# Patient Record
Sex: MALE | Race: WHITE | URBAN - METROPOLITAN AREA
[De-identification: names, ages, dates, MRNs, and addresses within clinical notes are randomized per-mention and may not be internally consistent; named-entity substitution may affect disease eponyms.]

---

## 2017-01-30 ENCOUNTER — EMERGENCY (EMERGENCY)
Facility: HOSPITAL | Age: 37
LOS: 1 days | Discharge: HOME | End: 2017-01-30
Attending: EMERGENCY MEDICINE
Payer: COMMERCIAL

## 2017-01-30 VITALS
DIASTOLIC BLOOD PRESSURE: 81 MMHG | TEMPERATURE: 98 F | RESPIRATION RATE: 20 BRPM | OXYGEN SATURATION: 97 % | WEIGHT: 199.96 LBS | HEART RATE: 110 BPM | SYSTOLIC BLOOD PRESSURE: 128 MMHG

## 2017-01-30 DIAGNOSIS — R04.0 EPISTAXIS: ICD-10-CM

## 2017-01-30 PROCEDURE — 99282 EMERGENCY DEPT VISIT SF MDM: CPT

## 2017-01-30 PROCEDURE — T1013: CPT

## 2017-01-30 NOTE — ED STATDOCS - OBJECTIVE STATEMENT
37 y/o male presents to ED c/o intermittent epistaxis from right naris x 3 days. Pt reports that he did not pinch his nostrils to stop the bleeding. He states that he held paper towels under his nose to catch the blood until the bleeding stopped. Denies foreign body entry to right naris. No PMHx. No further complaints at this time.

## 2017-01-30 NOTE — ED STATDOCS - DETAILS:
I, David Wiseman, performed the initial face to face bedside interview with this patient regarding history of present illness, review of symptoms and relevant past medical, social and family history.  I completed an independent physical examination.    The history, relevant review of systems, past medical and surgical history, medical decision making, and physical examination was documented by the scribe in my presence and I attest to the accuracy of the documentation.

## 2017-01-30 NOTE — ED STATDOCS - NS ED MD SCRIBE ATTENDING SCRIBE SECTIONS
VITAL SIGNS( Pullset)/HIV/HISTORY OF PRESENT ILLNESS/PAST MEDICAL/SURGICAL/SOCIAL HISTORY/REVIEW OF SYSTEMS/DISPOSITION/PHYSICAL EXAM

## 2021-12-12 ENCOUNTER — TRANSCRIPTION ENCOUNTER (OUTPATIENT)
Age: 41
End: 2021-12-12

## 2023-01-27 ENCOUNTER — OFFICE (OUTPATIENT)
Dept: URBAN - METROPOLITAN AREA CLINIC 94 | Facility: CLINIC | Age: 43
Setting detail: OPHTHALMOLOGY
End: 2023-01-27
Payer: COMMERCIAL

## 2023-01-27 DIAGNOSIS — H11.151: ICD-10-CM

## 2023-01-27 DIAGNOSIS — E11.9: ICD-10-CM

## 2023-01-27 DIAGNOSIS — H11.042: ICD-10-CM

## 2023-01-27 DIAGNOSIS — H35.033: ICD-10-CM

## 2023-01-27 PROCEDURE — 99213 OFFICE O/P EST LOW 20 MIN: CPT | Performed by: PHYSICIAN ASSISTANT

## 2023-01-27 ASSESSMENT — KERATOMETRY
OD_K1POWER_DIOPTERS: 39.50
OS_AXISANGLE_DEGREES: 080
OD_K2POWER_DIOPTERS: 40.25
OS_K2POWER_DIOPTERS: 40.25
OS_K1POWER_DIOPTERS: 39.50
OD_AXISANGLE_DEGREES: 080

## 2023-01-27 ASSESSMENT — SPHEQUIV_DERIVED
OS_SPHEQUIV: -0.125
OD_SPHEQUIV: -0.125

## 2023-01-27 ASSESSMENT — CONFRONTATIONAL VISUAL FIELD TEST (CVF)
OS_FINDINGS: FULL
OD_FINDINGS: FULL

## 2023-01-27 ASSESSMENT — VISUAL ACUITY
OD_BCVA: 20/20
OS_BCVA: 20/20

## 2023-01-27 ASSESSMENT — REFRACTION_AUTOREFRACTION
OD_CYLINDER: -0.25
OS_CYLINDER: -0.25
OD_SPHERE: 0.00
OS_SPHERE: 0.00
OD_AXIS: 015
OS_AXIS: 160

## 2023-01-27 ASSESSMENT — REFRACTION_MANIFEST
OD_SPHERE: PLANO
OS_SPHERE: PLANO
OS_VA1: 20/20
OD_VA1: 20/20
OS_SPHERE: PLANO
OS_VA1: 20/20
OD_VA1: 20/20
OD_SPHERE: PLANO

## 2023-01-27 ASSESSMENT — AXIALLENGTH_DERIVED
OS_AL: 25.0579
OD_AL: 25.0579

## 2023-01-27 ASSESSMENT — TONOMETRY
OD_IOP_MMHG: 15
OS_IOP_MMHG: 16

## 2023-01-27 ASSESSMENT — CORNEAL SURGICAL SCARRING: OS_SCARRING: ANTERIOR STROMAL

## 2023-08-25 ENCOUNTER — OUTPATIENT (OUTPATIENT)
Dept: OUTPATIENT SERVICES | Facility: HOSPITAL | Age: 43
LOS: 1 days | End: 2023-08-25

## 2023-08-25 ENCOUNTER — APPOINTMENT (OUTPATIENT)
Dept: ULTRASOUND IMAGING | Facility: CLINIC | Age: 43
End: 2023-08-25
Payer: MEDICAID

## 2023-08-25 DIAGNOSIS — E11.9 TYPE 2 DIABETES MELLITUS WITHOUT COMPLICATIONS: ICD-10-CM

## 2023-08-25 PROCEDURE — 93923 UPR/LXTR ART STDY 3+ LVLS: CPT | Mod: 26

## 2024-01-08 ENCOUNTER — OFFICE (OUTPATIENT)
Dept: URBAN - METROPOLITAN AREA CLINIC 94 | Facility: CLINIC | Age: 44
Setting detail: OPHTHALMOLOGY
End: 2024-01-08
Payer: COMMERCIAL

## 2024-01-08 DIAGNOSIS — H11.151: ICD-10-CM

## 2024-01-08 DIAGNOSIS — H35.033: ICD-10-CM

## 2024-01-08 PROCEDURE — 92250 FUNDUS PHOTOGRAPHY W/I&R: CPT | Performed by: PHYSICIAN ASSISTANT

## 2024-01-08 PROCEDURE — 92014 COMPRE OPH EXAM EST PT 1/>: CPT | Performed by: PHYSICIAN ASSISTANT

## 2024-01-08 ASSESSMENT — REFRACTION_AUTOREFRACTION
OD_SPHERE: +0.25
OS_CYLINDER: -0.50
OD_AXIS: 011
OD_CYLINDER: -0.75
OS_AXIS: 155
OS_SPHERE: PLANO

## 2024-01-08 ASSESSMENT — REFRACTION_MANIFEST
OS_VA1: 20/20
OS_SPHERE: PLANO
OD_VA1: 20/20
OS_VA1: 20/20
OS_SPHERE: PLANO
OD_SPHERE: PLANO
OD_VA1: 20/20
OD_SPHERE: PLANO

## 2024-01-08 ASSESSMENT — CORNEAL SURGICAL SCARRING: OS_SCARRING: ANTERIOR STROMAL

## 2024-01-08 ASSESSMENT — CONFRONTATIONAL VISUAL FIELD TEST (CVF)
OS_FINDINGS: FULL
OD_FINDINGS: FULL

## 2024-01-08 ASSESSMENT — SPHEQUIV_DERIVED: OD_SPHEQUIV: -0.125

## 2024-08-07 ENCOUNTER — NON-APPOINTMENT (OUTPATIENT)
Age: 44
End: 2024-08-07

## 2024-12-31 ENCOUNTER — EMERGENCY (EMERGENCY)
Facility: HOSPITAL | Age: 44
LOS: 1 days | Discharge: HOME | End: 2024-12-31
Attending: STUDENT IN AN ORGANIZED HEALTH CARE EDUCATION/TRAINING PROGRAM
Payer: SELF-PAY

## 2024-12-31 VITALS
OXYGEN SATURATION: 96 % | WEIGHT: 240.08 LBS | RESPIRATION RATE: 16 BRPM | DIASTOLIC BLOOD PRESSURE: 88 MMHG | SYSTOLIC BLOOD PRESSURE: 138 MMHG | HEART RATE: 96 BPM

## 2024-12-31 LAB
ALBUMIN SERPL ELPH-MCNC: 4.2 G/DL — SIGNIFICANT CHANGE UP (ref 3.3–5.2)
ALP SERPL-CCNC: 106 U/L — SIGNIFICANT CHANGE UP (ref 40–120)
ALT FLD-CCNC: 54 U/L — HIGH
ANION GAP SERPL CALC-SCNC: 16 MMOL/L — SIGNIFICANT CHANGE UP (ref 5–17)
APAP SERPL-MCNC: <3 UG/ML — LOW (ref 10–26)
AST SERPL-CCNC: 69 U/L — HIGH
BASOPHILS # BLD AUTO: 0.09 K/UL — SIGNIFICANT CHANGE UP (ref 0–0.2)
BASOPHILS NFR BLD AUTO: 1.1 % — SIGNIFICANT CHANGE UP (ref 0–2)
BILIRUB SERPL-MCNC: 0.7 MG/DL — SIGNIFICANT CHANGE UP (ref 0.4–2)
BUN SERPL-MCNC: 6.1 MG/DL — LOW (ref 8–20)
CALCIUM SERPL-MCNC: 8.3 MG/DL — LOW (ref 8.4–10.5)
CHLORIDE SERPL-SCNC: 93 MMOL/L — LOW (ref 96–108)
CK MB CFR SERPL CALC: 6.5 NG/ML — SIGNIFICANT CHANGE UP (ref 0–6.7)
CK SERPL-CCNC: 516 U/L — HIGH (ref 30–200)
CO2 SERPL-SCNC: 27 MMOL/L — SIGNIFICANT CHANGE UP (ref 22–29)
CREAT SERPL-MCNC: 0.55 MG/DL — SIGNIFICANT CHANGE UP (ref 0.5–1.3)
EGFR: 128 ML/MIN/1.73M2 — SIGNIFICANT CHANGE UP
EOSINOPHIL # BLD AUTO: 0.03 K/UL — SIGNIFICANT CHANGE UP (ref 0–0.5)
EOSINOPHIL NFR BLD AUTO: 0.4 % — SIGNIFICANT CHANGE UP (ref 0–6)
ETHANOL SERPL-MCNC: 408 MG/DL — HIGH (ref 0–9)
GLUCOSE SERPL-MCNC: 235 MG/DL — HIGH (ref 70–99)
HCT VFR BLD CALC: 42.7 % — SIGNIFICANT CHANGE UP (ref 39–50)
HGB BLD-MCNC: 15.1 G/DL — SIGNIFICANT CHANGE UP (ref 13–17)
IMM GRANULOCYTES NFR BLD AUTO: 0.1 % — SIGNIFICANT CHANGE UP (ref 0–0.9)
LYMPHOCYTES # BLD AUTO: 2.09 K/UL — SIGNIFICANT CHANGE UP (ref 1–3.3)
LYMPHOCYTES # BLD AUTO: 26.4 % — SIGNIFICANT CHANGE UP (ref 13–44)
MCHC RBC-ENTMCNC: 29.8 PG — SIGNIFICANT CHANGE UP (ref 27–34)
MCHC RBC-ENTMCNC: 35.4 G/DL — SIGNIFICANT CHANGE UP (ref 32–36)
MCV RBC AUTO: 84.4 FL — SIGNIFICANT CHANGE UP (ref 80–100)
MONOCYTES # BLD AUTO: 0.48 K/UL — SIGNIFICANT CHANGE UP (ref 0–0.9)
MONOCYTES NFR BLD AUTO: 6.1 % — SIGNIFICANT CHANGE UP (ref 2–14)
NEUTROPHILS # BLD AUTO: 5.21 K/UL — SIGNIFICANT CHANGE UP (ref 1.8–7.4)
NEUTROPHILS NFR BLD AUTO: 65.9 % — SIGNIFICANT CHANGE UP (ref 43–77)
PLATELET # BLD AUTO: 219 K/UL — SIGNIFICANT CHANGE UP (ref 150–400)
POTASSIUM SERPL-MCNC: 3.1 MMOL/L — LOW (ref 3.5–5.3)
POTASSIUM SERPL-SCNC: 3.1 MMOL/L — LOW (ref 3.5–5.3)
PROT SERPL-MCNC: 8 G/DL — SIGNIFICANT CHANGE UP (ref 6.6–8.7)
RBC # BLD: 5.06 M/UL — SIGNIFICANT CHANGE UP (ref 4.2–5.8)
RBC # FLD: 11.9 % — SIGNIFICANT CHANGE UP (ref 10.3–14.5)
SALICYLATES SERPL-MCNC: <0.6 MG/DL — LOW (ref 10–20)
SODIUM SERPL-SCNC: 136 MMOL/L — SIGNIFICANT CHANGE UP (ref 135–145)
WBC # BLD: 7.91 K/UL — SIGNIFICANT CHANGE UP (ref 3.8–10.5)
WBC # FLD AUTO: 7.91 K/UL — SIGNIFICANT CHANGE UP (ref 3.8–10.5)

## 2024-12-31 PROCEDURE — 70450 CT HEAD/BRAIN W/O DYE: CPT | Mod: 26

## 2024-12-31 PROCEDURE — 72125 CT NECK SPINE W/O DYE: CPT | Mod: 26

## 2024-12-31 PROCEDURE — 99285 EMERGENCY DEPT VISIT HI MDM: CPT

## 2024-12-31 PROCEDURE — 93010 ELECTROCARDIOGRAM REPORT: CPT

## 2024-12-31 RX ORDER — KETAMINE HCL 100 MG/ML
200 VIAL (ML) INJECTION ONCE
Refills: 0 | Status: DISCONTINUED | OUTPATIENT
Start: 2024-12-31 | End: 2024-12-31

## 2024-12-31 RX ORDER — LORAZEPAM 1 MG/1
2 TABLET ORAL ONCE
Refills: 0 | Status: DISCONTINUED | OUTPATIENT
Start: 2024-12-31 | End: 2024-12-31

## 2024-12-31 RX ADMIN — LORAZEPAM 2 MILLIGRAM(S): 1 TABLET ORAL at 20:02

## 2024-12-31 RX ADMIN — Medication 200 MILLIGRAM(S): at 15:55

## 2024-12-31 NOTE — ED PROVIDER NOTE - ATTENDING CONTRIBUTION TO CARE
40-year-old male with unclear past medical history presents to the ED for altered mental status.  Patient was found unresponsive in the street by EMS with abrasion noted to forehead, slurring words.  Patient admits to drinking alcohol but unclear how much.  Patient became combative agitated attempting to tackle staff.  Patient given IM ketamine for safety of patient and staff and for medical workup.  Priority head and C-spine ordered which were negative for acute pathology.  Patient signed out to night team pending clinical sobriety

## 2024-12-31 NOTE — ED PROVIDER NOTE - PATIENT PORTAL LINK FT
You can access the FollowMyHealth Patient Portal offered by [facility] by registering at the following website: [email or web address]. By joining [employer]’s FollowMyHealth portal, you will also be able to view your health information using other applications (apps) compatible with our system.

## 2024-12-31 NOTE — ED PROVIDER NOTE - NSFOLLOWUPINSTRUCTIONS_ED_ALL_ED_FT
- Acuda a kayleigh stephan de seguimiento con hinojosa médico en un plazo de 2 a 3 días.   - Acuda a kayleigh stephan de seguimiento con cirugía ortopédica en un plazo de 1 a 3 semanas   - Lleve los resultados a la stephan.     - Manténgase juan josé hidratado.   - Regrese al servicio de urgencias si aparecen síntomas nuevos o empeoran.  - Por favor, tome jhon medicamentos según lo recetado  - [place] de 500 mg a 1000 mg de Tylenol (acetaminofén) con alimentos cada 6 horas irena 3 días para reducir la inflamación y tratar el dolor. Después, puede tomarlo según sea necesario; siga las instrucciones del envase. La dosis estándar de Tylenol es de 500 mg, por lo que debe jany 1 o 2 pastillas por dosis. No tome más de 4000 mg por día.  - [place] 600 mg de ibuprofeno (Motrin, Advil) con alimentos cada 8 horas irena 3 días para reducir la inflamación y tratar el dolor. Después, puede tomarlo según sea necesario; siga las instrucciones del envase. La dosis estándar de Motrin o Advil es de 200 mg, por lo que debe jany de 3 a 4 pastillas por dosis. No lo tome si está embarazada o planea quedar embarazada.  - Para de jany alcol   Esguince de tobillo  Ankle Sprain  Illustration of an ankle sprain caused by a foot turning outward and a foot turning inward.  El esguince de tobillo es la distensión o el desgarro de un ligamento del tobillo. Los ligamentos son tejidos que conectan los huesos.    Los dos tipos de esguince de tobillo más frecuentes son los siguientes:  Esguince por inversión. [place]ress sucede cuando el pie gira hacia adentro y el tobillo gira hacia afuera. Afecta el ligamento de la parte externa del pie (ligamento lateral).  Esguince por eversión. [place] sucede cuando el pie gira hacia afuera y el tobillo gira hacia adentro. Afecta el ligamento de la parte interna del pie (ligamento medial).  ¿Cuáles son las causas?  La causa de un esguince de tobillo suele ser la rotación o la torcedura del tobillo por accidente.    ¿Qué incrementa el riesgo?  Tiene más probabilidades de tener un esguince de tobillo si practica deportes.    ¿Cuáles son los signos o síntomas?  Comparison of a normal ankle and an ankle that is swollen and bruised.  Los síntomas de un esguince de tobillo incluyen los siguientes:  Dolor en el tobillo.  Hinchazón.  Moretones. Los moretones pueden aparecer inmediatamente después del esguince de tobillo, o 1 o 2 días después.  Dificultad para mantenerse de pie o caminar. [place] incluye problemas para girar o para cambiar de dirección.  ¿Cómo se diagnostica?  El esguince de tobillo se diagnostica con un examen físico. El médico le presionará ciertas partes del pie y el tobillo e intentará moverlas de formas determinadas.    También es posible que le tomen radiografías. Le pueden jany estas imágenes para determinar la gravedad del esguince y para detectar si hay huesos fracturados.    ¿Cómo se trata?  El esguince de tobillo puede tratarse con lo siguiente:  Un dispositivo ortopédico o kayleigh férula. Se utiliza para evitar los movimientos del tobillo hasta que se cure.  Kayleigh venda elástica (vendaje). Se utiliza para sostener el tobillo.  Muletas.  Analgésicos.  Cirugía. [place] puede ser necesario si el esguince es grave.  Fisioterapia. Puede ayudar a mejorar la amplitud de movimiento del tobillo.  Siga estas indicaciones en hinojosa casa:  Si tiene un dispositivo ortopédico o kayleigh férula desmontables:    Use el dispositivo ortopédico o la férula nitin se lo haya indicado el médico. Quíteselos solamente nitin se lo haya indicado el médico.  Controle todos los bianca la piel alrededor de la férula o el dispositivo ortopédico. Informe al médico acerca de cualquier inquietud.  Afloje el dispositivo ortopédico o la férula si siente hormigueo en los dedos de los pies, si se le adormecen, o se le enfrían y se tornan de color jolynn.  Mantenga el dispositivo ortopédico y la férula limpios.  Si el dispositivo ortopédico o la férula no son impermeables:  No deje que se mojen.  Cúbralos con un envoltorio hermético cuando tome un baño de inmersión o kayleigh ducha.  Si tiene un vendaje elástico:    Quítese el vendaje para bañarse o ducharse.  Si siente que el vendaje está muy ajustado, aflójelo un poco para estar más cómodo.  Afloje el vendaje si siente hormigueos en el pie, si se le entumece o si se le enfría y se torna de color jolynn.  Control del dolor, la rigidez y la hinchazón    Si se lo indican, aplique hielo en la ranjit afectada.  Si tiene un dispositivo ortopédico o kayleigh férula desmontables, quíteselos nitin se lo haya indicado el médico.  Ponga el hielo en kayleigh bolsa plástica.  Coloque kayleigh toalla entre la piel y la bolsa.  Aplique el hielo irena 20 minutos, 2 a 3 veces por día.  Retire el hielo si la piel se pone de color mcclelland brillante. Cypress es muy importante. Si no puede sentir dolor, calor o frío, tiene un mayor riesgo de que se dañe la ranjit.  Si la piel se le pone de color mcclelland brillante, retire el hielo de inmediato para evitar daños en la piel. El riesgo de daño es mayor si no puede sentir dolor, calor o frío.  Mueva los dedos del pie con frecuencia para reducir la rigidez y la hinchazón.  Irena 2 o 3 días, levante (eleve) el tobillo por encima del nivel del corazón cuando esté sentado o acostado.  Indicaciones generales    Use los medicamentos de venta zelalem y los recetados solo nitin se lo haya indicado el médico.  No consuma ningún producto que contenga nicotina o tabaco. Estos productos incluyen cigarrillos, tabaco para mascar y aparatos de vapeo, nitin los cigarrillos electrónicos. Si necesita ayuda para dejar de consumir estos productos, consulte al médico.  Mantenga el tobillo en reposo.  No use el tobillo para apoyar el peso del cuerpo hasta que el médico lo autorice. Use las muletas nitin se lo haya indicado el médico.  Pregúntele al médico cuándo puede volver a conducir vehículos si tiene un dispositivo ortopédico o kayleigh férula en el tobillo.  Comuníquese con un médico si:  Tiene hinchazón o moretones que empeoran de repente.  El dolor no mejora con medicamentos.  Solicite ayuda de inmediato si:  El pie o los dedos del pie se le adormecen o se ponen de color jolynn.  Siente un dolor intenso que empeora.

## 2024-12-31 NOTE — ED PROVIDER NOTE - NS ED MD DISPO DISCHARGE CCDA
Normal rate, regular rhythm.  Heart sounds S1, S2.  No murmurs, rubs or gallops.
Patient/Caregiver provided printed discharge information.
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
## 2024-12-31 NOTE — ED PROVIDER NOTE - CARE PROVIDER_API CALL
Willie Franco  Orthopaedic Trauma  [address] [place] [address]  Phone: [phone]  Fax: [phone]  Follow Up Time: Routine

## 2024-12-31 NOTE — ED PROVIDER NOTE - CLINICAL SUMMARY MEDICAL DECISION MAKING FREE TEXT BOX
40-year-old male patient presents to the ED for alcohol intoxication.  CT head negative, patient combative, given IM ketamine.  Placed on continual pulse ox, end-tidal.  Labs, monitor for sobriety 40-year-old male patient presents to the ED for alcohol intoxication.  CT head negative, patient combative, given IM ketamine.  Placed on continual pulse ox, end-tidal.  Labs, monitor for sobriety. Pt reporting R ankle pain over achilles tendon, no point tenderness to medial or lateral malleolus, ordered Xrays. Xrays not demonstrating fractures. Placed Ace Wrap. Pt able to ambulate and weight bear as tolerated. Discussed w patient to follow up with Ortho outpt.
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
## 2024-12-31 NOTE — ED PROVIDER NOTE - PHYSICAL EXAMINATION
Const: Pt is slurring words, lethargic  HEENT:Airway patent   Eyes: 4 mm  pupils and sluggish   Neck:. Soft and supple. Full ROM without pain. No cervical midline tenderness.   Cardiac: Regular rate and regular rhythm. +S1/S2. No murmurs, rubs or gallops. Peripheral pulses 2+ and symmetric. No LE edema.  Resp: Breath sounds equal and clear bilaterally. No wheezes, rales or rhonchi.  Abd: Soft, non-tender, non-distended.  No guarding or rebound.  MSK: Moving all 4 extremities   Skin: Abrasion to right forehead  Neuro: No focal deficits Const: Pt is slurring words, lethargic  HEENT: Airway patent   Eyes: 4 mm  pupils and sluggish   Neck:. Soft and supple. Full ROM without pain. No cervical midline tenderness.   Cardiac: Regular rate and regular rhythm. +S1/S2. No murmurs, rubs or gallops. Peripheral pulses 2+ and symmetric. No LE edema.  Resp: Breath sounds equal and clear bilaterally. No wheezes, rales or rhonchi.  Abd: Soft, non-tender, non-distended.  No guarding or rebound.  MSK: Moving all 4 extremities, R ankle minimal swelling, TTP over achilles tendon, no Medial/lat malleolus tenderness  Skin: Abrasion to right forehead  Neuro: No focal deficits

## 2025-01-01 VITALS
SYSTOLIC BLOOD PRESSURE: 106 MMHG | DIASTOLIC BLOOD PRESSURE: 75 MMHG | TEMPERATURE: 97 F | OXYGEN SATURATION: 99 % | RESPIRATION RATE: 16 BRPM | HEART RATE: 104 BPM

## 2025-01-01 PROCEDURE — 93005 ELECTROCARDIOGRAM TRACING: CPT

## 2025-01-01 PROCEDURE — 96372 THER/PROPH/DIAG INJ SC/IM: CPT | Mod: XU

## 2025-01-01 PROCEDURE — 80053 COMPREHEN METABOLIC PANEL: CPT

## 2025-01-01 PROCEDURE — 72125 CT NECK SPINE W/O DYE: CPT | Mod: MC

## 2025-01-01 PROCEDURE — 85025 COMPLETE CBC W/AUTO DIFF WBC: CPT

## 2025-01-01 PROCEDURE — 73610 X-RAY EXAM OF ANKLE: CPT

## 2025-01-01 PROCEDURE — 80307 DRUG TEST PRSMV CHEM ANLYZR: CPT

## 2025-01-01 PROCEDURE — 99285 EMERGENCY DEPT VISIT HI MDM: CPT | Mod: 25

## 2025-01-01 PROCEDURE — 82962 GLUCOSE BLOOD TEST: CPT

## 2025-01-01 PROCEDURE — 82553 CREATINE MB FRACTION: CPT

## 2025-01-01 PROCEDURE — 36415 COLL VENOUS BLD VENIPUNCTURE: CPT

## 2025-01-01 PROCEDURE — 70450 CT HEAD/BRAIN W/O DYE: CPT | Mod: MC

## 2025-01-01 PROCEDURE — 73610 X-RAY EXAM OF ANKLE: CPT | Mod: 26,RT

## 2025-01-01 PROCEDURE — 96374 THER/PROPH/DIAG INJ IV PUSH: CPT

## 2025-01-01 PROCEDURE — T1013: CPT

## 2025-01-01 PROCEDURE — 82550 ASSAY OF CK (CPK): CPT

## 2025-01-01 NOTE — ED ADULT NURSE REASSESSMENT NOTE - NS ED NURSE REASSESS COMMENT FT1
Report received from off going RN, care of pt assumed at 0730. pt received resting on stretcher, resp even and unlabored. oob and ambulating with steady gait, requesting his clothing to go home.. pt denies any new c/o at this time. pt updated on plan of care, questions asked and answered. .Registration at bedside.

## 2025-02-07 PROBLEM — Z00.00 ENCOUNTER FOR PREVENTIVE HEALTH EXAMINATION: Status: ACTIVE | Noted: 2025-02-07

## 2025-02-10 ENCOUNTER — APPOINTMENT (OUTPATIENT)
Age: 45
End: 2025-02-10
Payer: MEDICAID

## 2025-02-10 VITALS — HEIGHT: 69 IN

## 2025-02-10 DIAGNOSIS — E78.00 PURE HYPERCHOLESTEROLEMIA, UNSPECIFIED: ICD-10-CM

## 2025-02-10 DIAGNOSIS — E11.9 TYPE 2 DIABETES MELLITUS W/OUT COMPLICATIONS: ICD-10-CM

## 2025-02-10 DIAGNOSIS — J45.20 MILD INTERMITTENT ASTHMA, UNCOMPLICATED: ICD-10-CM

## 2025-02-10 DIAGNOSIS — I10 ESSENTIAL (PRIMARY) HYPERTENSION: ICD-10-CM

## 2025-02-10 DIAGNOSIS — Z78.9 OTHER SPECIFIED HEALTH STATUS: ICD-10-CM

## 2025-02-10 PROCEDURE — 99203 OFFICE O/P NEW LOW 30 MIN: CPT

## 2025-04-17 ENCOUNTER — EMERGENCY (EMERGENCY)
Facility: HOSPITAL | Age: 45
LOS: 1 days | End: 2025-04-17
Attending: EMERGENCY MEDICINE
Payer: COMMERCIAL

## 2025-04-17 VITALS
SYSTOLIC BLOOD PRESSURE: 125 MMHG | RESPIRATION RATE: 18 BRPM | HEART RATE: 115 BPM | DIASTOLIC BLOOD PRESSURE: 75 MMHG | OXYGEN SATURATION: 96 %

## 2025-04-17 VITALS
WEIGHT: 179.9 LBS | TEMPERATURE: 99 F | OXYGEN SATURATION: 99 % | SYSTOLIC BLOOD PRESSURE: 134 MMHG | DIASTOLIC BLOOD PRESSURE: 90 MMHG | HEART RATE: 92 BPM | RESPIRATION RATE: 18 BRPM

## 2025-04-17 LAB
ALBUMIN SERPL ELPH-MCNC: 4.6 G/DL — SIGNIFICANT CHANGE UP (ref 3.3–5.2)
ALP SERPL-CCNC: 90 U/L — SIGNIFICANT CHANGE UP (ref 40–120)
ALT FLD-CCNC: 100 U/L — HIGH
ANION GAP SERPL CALC-SCNC: 18 MMOL/L — HIGH (ref 5–17)
AST SERPL-CCNC: 82 U/L — HIGH
BILIRUB SERPL-MCNC: 0.7 MG/DL — SIGNIFICANT CHANGE UP (ref 0.4–2)
BUN SERPL-MCNC: 9.4 MG/DL — SIGNIFICANT CHANGE UP (ref 8–20)
CALCIUM SERPL-MCNC: 8.4 MG/DL — SIGNIFICANT CHANGE UP (ref 8.4–10.5)
CHLORIDE SERPL-SCNC: 98 MMOL/L — SIGNIFICANT CHANGE UP (ref 96–108)
CO2 SERPL-SCNC: 24 MMOL/L — SIGNIFICANT CHANGE UP (ref 22–29)
CREAT SERPL-MCNC: 0.67 MG/DL — SIGNIFICANT CHANGE UP (ref 0.5–1.3)
EGFR: 114 ML/MIN/1.73M2 — SIGNIFICANT CHANGE UP
EGFR: 114 ML/MIN/1.73M2 — SIGNIFICANT CHANGE UP
ETHANOL SERPL-MCNC: 375 MG/DL — HIGH (ref 0–9)
GLUCOSE SERPL-MCNC: 145 MG/DL — HIGH (ref 70–99)
HCT VFR BLD CALC: 47.6 % — SIGNIFICANT CHANGE UP (ref 39–50)
HGB BLD-MCNC: 17 G/DL — SIGNIFICANT CHANGE UP (ref 13–17)
MCHC RBC-ENTMCNC: 31.3 PG — SIGNIFICANT CHANGE UP (ref 27–34)
MCHC RBC-ENTMCNC: 35.7 G/DL — SIGNIFICANT CHANGE UP (ref 32–36)
MCV RBC AUTO: 87.7 FL — SIGNIFICANT CHANGE UP (ref 80–100)
NRBC # BLD AUTO: 0 K/UL — SIGNIFICANT CHANGE UP (ref 0–0)
NRBC # FLD: 0 K/UL — SIGNIFICANT CHANGE UP (ref 0–0)
NRBC BLD AUTO-RTO: 0 /100 WBCS — SIGNIFICANT CHANGE UP (ref 0–0)
PLATELET # BLD AUTO: 335 K/UL — SIGNIFICANT CHANGE UP (ref 150–400)
PMV BLD: 9.2 FL — SIGNIFICANT CHANGE UP (ref 7–13)
POTASSIUM SERPL-MCNC: 3.5 MMOL/L — SIGNIFICANT CHANGE UP (ref 3.5–5.3)
POTASSIUM SERPL-SCNC: 3.5 MMOL/L — SIGNIFICANT CHANGE UP (ref 3.5–5.3)
PROT SERPL-MCNC: 8.5 G/DL — SIGNIFICANT CHANGE UP (ref 6.6–8.7)
RBC # BLD: 5.43 M/UL — SIGNIFICANT CHANGE UP (ref 4.2–5.8)
RBC # FLD: 11.9 % — SIGNIFICANT CHANGE UP (ref 10.3–14.5)
SODIUM SERPL-SCNC: 140 MMOL/L — SIGNIFICANT CHANGE UP (ref 135–145)
WBC # BLD: 7.26 K/UL — SIGNIFICANT CHANGE UP (ref 3.8–10.5)
WBC # FLD AUTO: 7.26 K/UL — SIGNIFICANT CHANGE UP (ref 3.8–10.5)

## 2025-04-17 PROCEDURE — 99284 EMERGENCY DEPT VISIT MOD MDM: CPT

## 2025-04-17 PROCEDURE — 82962 GLUCOSE BLOOD TEST: CPT

## 2025-04-17 PROCEDURE — 99285 EMERGENCY DEPT VISIT HI MDM: CPT | Mod: 25

## 2025-04-17 PROCEDURE — 80307 DRUG TEST PRSMV CHEM ANLYZR: CPT

## 2025-04-17 PROCEDURE — 80053 COMPREHEN METABOLIC PANEL: CPT

## 2025-04-17 PROCEDURE — 36415 COLL VENOUS BLD VENIPUNCTURE: CPT

## 2025-04-17 PROCEDURE — 85027 COMPLETE CBC AUTOMATED: CPT

## 2025-04-17 PROCEDURE — 96372 THER/PROPH/DIAG INJ SC/IM: CPT

## 2025-04-17 PROCEDURE — T1013: CPT

## 2025-04-17 RX ORDER — DIPHENHYDRAMINE HCL 12.5MG/5ML
50 ELIXIR ORAL ONCE
Refills: 0 | Status: COMPLETED | OUTPATIENT
Start: 2025-04-17 | End: 2025-04-17

## 2025-04-17 RX ORDER — MIDAZOLAM IN 0.9 % SOD.CHLORID 1 MG/ML
5 PLASTIC BAG, INJECTION (ML) INTRAVENOUS ONCE
Refills: 0 | Status: DISCONTINUED | OUTPATIENT
Start: 2025-04-17 | End: 2025-04-17

## 2025-04-17 RX ADMIN — Medication 5 MILLIGRAM(S): at 12:20

## 2025-04-17 RX ADMIN — Medication 50 MILLIGRAM(S): at 13:31

## 2025-04-17 NOTE — ED ADULT NURSE NOTE - NSFALLRISKINTERV_ED_ALL_ED
Assistance OOB with selected safe patient handling equipment if applicable/Assistance with ambulation/Communicate fall risk and risk factors to all staff, patient, and family/Monitor gait and stability/Monitor for mental status changes and reorient to person, place, and time, as needed/Move patient closer to nursing station/within visual sight of ED staff/Provide patient with walking aids/Provide visual cue: yellow wristband, yellow gown, etc/Reinforce activity limits and safety measures with patient and family/Toileting schedule using arm’s reach rule for commode and bathroom/Use of alarms - bed, stretcher, chair and/or video monitoring/Call bell, personal items and telephone in reach/Instruct patient to call for assistance before getting out of bed/chair/stretcher/Non-slip footwear applied when patient is off stretcher/[place] to call system/Physically safe environment - no spills, clutter or unnecessary equipment/Purposeful Proactive Rounding/Room/bathroom lighting operational, light cord in reach

## 2025-04-17 NOTE — ED ADULT NURSE NOTE - OBJECTIVE STATEMENT
Pt resting in bed A&Ox1 to self. Pt thinks we are in his house, pt states he needs to urinate. Pt educated that he cannot walk to the bathroom because he is unsteady. Pt denies drinking today, reports his last drink was 5 days ago. Pt repeatedly getting up and no sitting still. MD brought to bedside.

## 2025-04-17 NOTE — ED PROVIDER NOTE - PHYSICAL EXAMINATION
GENERAL intoxicated : no acute distress, comfortably in bed  HEENT: Atraumatic, normocephalic, non-icteric, no JVD  NEURO:  A&Ox3, no focal deficits, moving all extremities spontaneously, no dysarthria, CN II-XII grossly intact  PSYCH: Normal affect, calm, appropriate insight and judgment, fluent speech  LUNGS: CTAB, no wrr, non-labored breathing  HEART: RRR, no murmur appreciated  ABD: Soft, non-tender, non-distended, no organomegaly, no appreciable masses, +bs all 4 quadrants  EXTREMITIES: Nontender, no clubbing, cyanosis, or edema  SKIN: No rashes or lesions

## 2025-04-17 NOTE — ED PROVIDER NOTE - PATIENT PORTAL LINK FT
You can access the FollowMyHealth Patient Portal offered by [facility] by registering at the following website: [email or web address]. By joining [employer]’s FollowMyHealth portal, you will also be able to view your health information using other applications (apps) compatible with our system.

## 2025-04-17 NOTE — ED PROVIDER NOTE - CLINICAL SUMMARY MEDICAL DECISION MAKING FREE TEXT BOX
Pt with intoxicated needed sedation now clincally sober. Feeling improved  now clincally sober stable for dc.
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
## 2025-04-17 NOTE — ED ADULT NURSE REASSESSMENT NOTE - NS ED NURSE REASSESS COMMENT FT1
Pt noted to have sp02 86% RA, Pt placed on 3 LPM via n/c, sp02 wnl, respirations are even and unlabored. Pt resting comfortably in bed with HOB elevated. MD Ludwig made aware, awaiting any new orders.